# Patient Record
Sex: FEMALE | URBAN - METROPOLITAN AREA
[De-identification: names, ages, dates, MRNs, and addresses within clinical notes are randomized per-mention and may not be internally consistent; named-entity substitution may affect disease eponyms.]

---

## 2020-06-15 ENCOUNTER — OFFICE VISIT (OUTPATIENT)
Dept: URBAN - METROPOLITAN AREA CLINIC 72 | Facility: CLINIC | Age: 65
End: 2020-06-15
Payer: COMMERCIAL

## 2020-06-15 DIAGNOSIS — Z86.73 PERSONAL HISTORY OF STROKE W/O RESIDUAL DEFICIT: ICD-10-CM

## 2020-06-15 PROCEDURE — 99204 OFFICE O/P NEW MOD 45 MIN: CPT | Performed by: OPHTHALMOLOGY

## 2020-06-15 PROCEDURE — 92083 EXTENDED VISUAL FIELD XM: CPT | Performed by: OPHTHALMOLOGY

## 2020-06-15 ASSESSMENT — INTRAOCULAR PRESSURE
OS: 17
OD: 19

## 2020-06-15 NOTE — IMPRESSION/PLAN
Impression: Personal history of stroke w/o residual deficit: Z86.73. Plan: Discussed diagnosis in detail with patient. No treatment is required at this time. Will continue to observe condition and or symptoms.

## 2020-08-07 ENCOUNTER — OFFICE VISIT (OUTPATIENT)
Dept: URBAN - METROPOLITAN AREA CLINIC 72 | Facility: CLINIC | Age: 65
End: 2020-08-07
Payer: COMMERCIAL

## 2020-08-07 DIAGNOSIS — H52.4 PRESBYOPIA: Primary | ICD-10-CM

## 2020-08-07 DIAGNOSIS — H25.13 BILATERAL NUCLEAR SCLEROSIS CATARACT: ICD-10-CM

## 2020-08-07 PROCEDURE — 92012 INTRM OPH EXAM EST PATIENT: CPT | Performed by: OPTOMETRIST

## 2020-08-07 ASSESSMENT — VISUAL ACUITY
OD: 20/25
OS: 20/25

## 2020-08-07 ASSESSMENT — INTRAOCULAR PRESSURE
OS: 15
OD: 16

## 2020-08-07 NOTE — IMPRESSION/PLAN
Impression: Bilateral nuclear sclerosis cataract: H25.13. Plan: Discussed signs and symptoms of cataract progression. Pt to RTC PRN in the event of changes to visual status. No recommendation for surgery at present time. Will continue to monitor.